# Patient Record
Sex: FEMALE | Race: WHITE
[De-identification: names, ages, dates, MRNs, and addresses within clinical notes are randomized per-mention and may not be internally consistent; named-entity substitution may affect disease eponyms.]

---

## 2019-01-21 ENCOUNTER — HOSPITAL ENCOUNTER (EMERGENCY)
Dept: HOSPITAL 76 - ED | Age: 24
Discharge: HOME | End: 2019-01-21
Payer: COMMERCIAL

## 2019-01-21 VITALS — SYSTOLIC BLOOD PRESSURE: 112 MMHG | DIASTOLIC BLOOD PRESSURE: 68 MMHG

## 2019-01-21 DIAGNOSIS — R11.2: Primary | ICD-10-CM

## 2019-01-21 DIAGNOSIS — R19.7: ICD-10-CM

## 2019-01-21 DIAGNOSIS — R10.30: ICD-10-CM

## 2019-01-21 LAB
ALBUMIN DIAFP-MCNC: 4.6 G/DL (ref 3.2–5.5)
ALBUMIN/GLOB SERPL: 1.2 {RATIO} (ref 1–2.2)
ALP SERPL-CCNC: 53 IU/L (ref 42–121)
ALT SERPL W P-5'-P-CCNC: 17 IU/L (ref 10–60)
AMPHET UR QL SCN: NEGATIVE
ANION GAP SERPL CALCULATED.4IONS-SCNC: 16 MMOL/L (ref 6–13)
AST SERPL W P-5'-P-CCNC: 28 IU/L (ref 10–42)
BASOPHILS NFR BLD AUTO: 0 10^3/UL (ref 0–0.1)
BASOPHILS NFR BLD AUTO: 0.5 %
BENZODIAZ UR QL SCN: NEGATIVE
BILIRUB BLD-MCNC: 1.2 MG/DL (ref 0.2–1)
BUN SERPL-MCNC: 14 MG/DL (ref 6–20)
CALCIUM UR-MCNC: 9.5 MG/DL (ref 8.5–10.3)
CHLORIDE SERPL-SCNC: 100 MMOL/L (ref 101–111)
CLARITY UR REFRACT.AUTO: CLEAR
CO2 SERPL-SCNC: 17 MMOL/L (ref 21–32)
COCAINE UR-SCNC: NEGATIVE UMOL/L
CREAT SERPLBLD-SCNC: 0.7 MG/DL (ref 0.4–1)
EOSINOPHIL # BLD AUTO: 0 10^3/UL (ref 0–0.7)
EOSINOPHIL NFR BLD AUTO: 0 %
ERYTHROCYTE [DISTWIDTH] IN BLOOD BY AUTOMATED COUNT: 13.6 % (ref 12–15)
GFRSERPLBLD MDRD-ARVRAT: 104 ML/MIN/{1.73_M2} (ref 89–?)
GLOBULIN SER-MCNC: 3.8 G/DL (ref 2.1–4.2)
GLUCOSE SERPL-MCNC: 137 MG/DL (ref 70–100)
GLUCOSE UR QL STRIP.AUTO: NEGATIVE MG/DL
HCG UR QL: NEGATIVE
HGB UR QL STRIP: 12.9 G/DL (ref 12–16)
KETONES UR QL STRIP.AUTO: >=80 MG/DL
LIPASE SERPL-CCNC: 23 U/L (ref 22–51)
LYMPHOCYTES # SPEC AUTO: 0.8 10^3/UL (ref 1.5–3.5)
LYMPHOCYTES NFR BLD AUTO: 10 %
MCH RBC QN AUTO: 29.6 PG (ref 27–31)
MCHC RBC AUTO-ENTMCNC: 33.8 G/DL (ref 32–36)
MCV RBC AUTO: 87.5 FL (ref 81–99)
METHADONE UR QL SCN: NEGATIVE
METHAMPHET UR QL SCN: NEGATIVE
MONOCYTES # BLD AUTO: 0.2 10^3/UL (ref 0–1)
MONOCYTES NFR BLD AUTO: 2.5 %
NEUTROPHILS # BLD AUTO: 6.7 10^3/UL (ref 1.5–6.6)
NEUTROPHILS # SNV AUTO: 7.7 X10^3/UL (ref 4.8–10.8)
NEUTROPHILS NFR BLD AUTO: 87 %
NITRITE UR QL STRIP.AUTO: NEGATIVE
OPIATES UR QL SCN: NEGATIVE
PDW BLD AUTO: 9 FL (ref 7.9–10.8)
PH UR STRIP.AUTO: 6 PH (ref 5–7.5)
PLATELET # BLD: 263 10^3/UL (ref 130–450)
PROT SPEC-MCNC: 8.4 G/DL (ref 6.7–8.2)
PROT UR STRIP.AUTO-MCNC: (no result) MG/DL
RBC # UR STRIP.AUTO: (no result) /UL
RBC MAR: 4.37 10^6/UL (ref 4.2–5.4)
SODIUM SERPLBLD-SCNC: 133 MMOL/L (ref 135–145)
SP GR UR STRIP.AUTO: >=1.03 (ref 1–1.03)
SP GR UR STRIP.AUTO: >=1.03 (ref 1–1.03)
UROBILINOGEN UR QL STRIP.AUTO: (no result) E.U./DL
UROBILINOGEN UR STRIP.AUTO-MCNC: NEGATIVE MG/DL
VOLATILE DRUGS POS SERPL SCN: (no result)

## 2019-01-21 PROCEDURE — 80306 DRUG TEST PRSMV INSTRMNT: CPT

## 2019-01-21 PROCEDURE — 81003 URINALYSIS AUTO W/O SCOPE: CPT

## 2019-01-21 PROCEDURE — 85025 COMPLETE CBC W/AUTO DIFF WBC: CPT

## 2019-01-21 PROCEDURE — 80053 COMPREHEN METABOLIC PANEL: CPT

## 2019-01-21 PROCEDURE — 81025 URINE PREGNANCY TEST: CPT

## 2019-01-21 PROCEDURE — 36415 COLL VENOUS BLD VENIPUNCTURE: CPT

## 2019-01-21 PROCEDURE — 87086 URINE CULTURE/COLONY COUNT: CPT

## 2019-01-21 PROCEDURE — 99283 EMERGENCY DEPT VISIT LOW MDM: CPT

## 2019-01-21 PROCEDURE — 83690 ASSAY OF LIPASE: CPT

## 2019-01-21 PROCEDURE — 81001 URINALYSIS AUTO W/SCOPE: CPT

## 2019-01-21 PROCEDURE — 74177 CT ABD & PELVIS W/CONTRAST: CPT

## 2019-01-21 NOTE — CT REPORT
Reason:  RLQ pain since this morning

Procedure Date:  01/21/2019   

Accession Number:  [id number] / [id number]                    

Procedure:  CT  - Abdomen/Pelvis W/ CPT Code:  

 

FULL RESULT:

 

 

EXAM:

CT ABDOMEN AND PELVIS

 

EXAM DATE: 1/21/2019 07:15 PM.

 

CLINICAL HISTORY: RLQ pain since this morning.

 

COMPARISONS: None.

 

TECHNIQUE: Routine helical CT imaging was performed through the abdomen 

and pelvis. IV contrast: 90 ML OPTIRAY 320. Enteric contrast: No. 

Reconstructions: Coronal and sagittal.

 

In accordance with CT protocol optimization, one or more of the following 

dose reduction techniques were utilized for this exam: automated exposure 

control, adjustment of mA and/or KV based on patient size, or use of 

iterative reconstructive technique.

 

FINDINGS:

Lung Bases: Unremarkable.

 

Liver: Normal. No masses.

 

Gallbladder/Bile Ducts: Unremarkable.

 

Spleen: Normal.

 

Pancreas: Normal.

 

Adrenal Glands: Normal.

 

Kidneys: Normal. No masses or hydronephrosis.

 

Peritoneal Cavity/Bowel: Normal. No free fluid, free air or adenopathy. 

No masses or acute inflammatory process. The appendix is not clearly 

identified. There is no CT evidence of acute appendicitis.

 

Pelvic Organs: Normal. The bladder and visualized pelvic organs are 

within normal limits.

 

Vasculature: No aneurysms or other significant abnormality.

 

Bones: No significant abnormality.

 

Other: None.

IMPRESSION: Nonvisualization of the appendix without CT evidence of acute 

appendicitis.

 

RADIA

## 2019-01-21 NOTE — ED PHYSICIAN DOCUMENTATION
PD HPI ABD PAIN





- Stated complaint


Stated Complaint: VOMITING/COLD SWEAT





- Chief complaint


Chief Complaint: Abd Pain





- History obtained from


History obtained from: Patient





- History of Present Illness


Timing - onset: Today


Timing - details: Abrupt onset, Still present


Quality: Cramping, Aching, Pain


Location: Periumbilical, RLQ, Suprapubic


Radiation: Lower back


Worsened by: Eating


Associated symptoms: Nausea, Diarrhea.  No: Fever, Vomiting, Constipation, 

Melena, Dysuria


Similar symptoms before: Has not had sx before


Recently seen: Not recently seen





Review of Systems


Constitutional: reports: Myalgias.  denies: Fever, Chills


Nose: denies: Rhinorrhea / runny nose, Congestion


Throat: denies: Sore throat


Respiratory: denies: Cough


GI: reports: Abdominal Pain, Nausea, Diarrhea.  denies: Abdominal Swelling, 

Vomiting, Constipation, Bloody / black stool


: denies: Dysuria, Frequency, Discharge, Irregular menses


Skin: denies: Rash, Lesions


Musculoskeletal: reports: Back pain.  denies: Neck pain, Extremity pain


Neurologic: reports: Generalized weakness.  denies: Focal weakness, Numbness





PD PAST MEDICAL HISTORY





- Past Medical History


Cardiovascular: None


Respiratory: None


Neuro: None


Endocrine/Autoimmune: None





- Present Medications


Home Medications: 


                                Ambulatory Orders











 Medication  Instructions  Recorded  Confirmed


 


Diphenoxylate/Atropine [Lomotil] 1 each PO QID PRN #12 tablet 01/21/19 


 


Naproxen 375 mg PO BID #20 tablet 01/21/19 


 


Norgestimate-Ethinyl Estradiol 1 tab PO DAILY 01/21/19 01/21/19





[Ortho Tri-Cyclen 28 Tablet]   


 


Ondansetron Odt [Zofran] 4 mg TL Q6H PRN #10 tablet 01/21/19 


 


Tramadol HCl 50 mg PO Q6H PRN #15 tablet 01/21/19 














- Allergies


Allergies/Adverse Reactions: 


                                    Allergies











Allergy/AdvReac Type Severity Reaction Status Date / Time


 


No Known Drug Allergies Allergy   Verified 01/21/19 16:32














PD ED PE NORMAL





- Vitals


Vital signs reviewed: Yes





- General


General: Alert and oriented X 3, Well developed/nourished, Other (seems very 

uncomfortable and knees drawn up. Holding emesis bag but not vomiting. )





- HEENT


HEENT: Ears normal, Pharynx benign





- Neck


Neck: Supple, no meningeal sign, No adenopathy





- Cardiac


Cardiac: RRR, No murmur





- Respiratory


Respiratory: Clear bilaterally





- Abdomen


Abdomen: Normal bowel sounds, Soft, Non distended, No organomegaly, Other 

(tender with local guarding in lower abd, right more than left, and also at 

suprapubic area. )





- Female 


Female : Deferred





- Back


Back: No CVA TTP





- Derm


Derm: Normal color, Warm and dry





- Extremities


Extremities: No deformity, No tenderness to palpate





- Neuro


Neuro: Alert and oriented X 3, No motor deficit, Normal speech





Results





- Vitals


Vitals: 


                                     Oxygen











O2 Source                      Room air

















- Labs


Labs: 


                                Laboratory Tests











  01/21/19 01/21/19 01/21/19





  17:11 17:11 18:15


 


WBC  7.7  


 


RBC  4.37  


 


Hgb  12.9  


 


Hct  38.3  


 


MCV  87.5  


 


MCH  29.6  


 


MCHC  33.8  


 


RDW  13.6  


 


Plt Count  263  


 


MPV  9.0  


 


Neut # (Auto)  6.7 H  


 


Lymph # (Auto)  0.8 L  


 


Mono # (Auto)  0.2  


 


Eos # (Auto)  0.0  


 


Baso # (Auto)  0.0  


 


Absolute Nucleated RBC  0.00  


 


Nucleated RBC %  0.0  


 


Sodium   133 L 


 


Potassium   3.6 


 


Chloride   100 L 


 


Carbon Dioxide   17 L 


 


Anion Gap   16.0 H 


 


BUN   14 


 


Creatinine   0.7 


 


Estimated GFR (MDRD)   104 


 


Glucose   137 H 


 


Calcium   9.5 


 


Total Bilirubin   1.2 H 


 


AST   28 


 


ALT   17 


 


Alkaline Phosphatase   53 


 


Total Protein   8.4 H 


 


Albumin   4.6 


 


Globulin   3.8 


 


Albumin/Globulin Ratio   1.2 


 


Lipase   23 


 


Urine Color    YELLOW


 


Urine Clarity    CLEAR


 


Urine pH    6.0


 


Ur Specific Gravity    >=1.030 H


 


Urine Protein    TRACE


 


Urine Glucose (UA)    NEGATIVE


 


Urine Ketones    >=80 H


 


Urine Occult Blood    TRACE-LYSE


 


Urine Nitrite    NEGATIVE


 


Urine Bilirubin    NEGATIVE


 


Urine Urobilinogen    0.2 (NORMAL)


 


Ur Leukocyte Esterase    NEGATIVE


 


Ur Microscopic Review    NOT INDICATED


 


Urine Culture Comments    NOT INDICATED


 


Urine HCG, Qual   


 


Urine Opiates Screen   


 


Ur Oxycodone Screen   


 


Urine Methadone Screen   


 


Ur Propoxyphene Screen   


 


Ur Barbiturates Screen   


 


Ur Tricyclics Screen   


 


Ur Phencyclidine Scrn   


 


Ur Amphetamine Screen   


 


U Methamphetamines Scrn   


 


U Benzodiazepines Scrn   


 


Urine Cocaine Screen   


 


U Cannabinoids Screen   














  01/21/19 01/21/19





  18:15 18:15


 


WBC  


 


RBC  


 


Hgb  


 


Hct  


 


MCV  


 


MCH  


 


MCHC  


 


RDW  


 


Plt Count  


 


MPV  


 


Neut # (Auto)  


 


Lymph # (Auto)  


 


Mono # (Auto)  


 


Eos # (Auto)  


 


Baso # (Auto)  


 


Absolute Nucleated RBC  


 


Nucleated RBC %  


 


Sodium  


 


Potassium  


 


Chloride  


 


Carbon Dioxide  


 


Anion Gap  


 


BUN  


 


Creatinine  


 


Estimated GFR (MDRD)  


 


Glucose  


 


Calcium  


 


Total Bilirubin  


 


AST  


 


ALT  


 


Alkaline Phosphatase  


 


Total Protein  


 


Albumin  


 


Globulin  


 


Albumin/Globulin Ratio  


 


Lipase  


 


Urine Color  


 


Urine Clarity  


 


Urine pH  


 


Ur Specific Gravity  >=1.030 H 


 


Urine Protein  


 


Urine Glucose (UA)  


 


Urine Ketones  


 


Urine Occult Blood  


 


Urine Nitrite  


 


Urine Bilirubin  


 


Urine Urobilinogen  


 


Ur Leukocyte Esterase  


 


Ur Microscopic Review  


 


Urine Culture Comments  


 


Urine HCG, Qual  NEGATIVE 


 


Urine Opiates Screen   NEGATIVE


 


Ur Oxycodone Screen   NEGATIVE


 


Urine Methadone Screen   NEGATIVE


 


Ur Propoxyphene Screen   NEGATIVE


 


Ur Barbiturates Screen   NEGATIVE


 


Ur Tricyclics Screen   NEGATIVE


 


Ur Phencyclidine Scrn   NEGATIVE


 


Ur Amphetamine Screen   NEGATIVE


 


U Methamphetamines Scrn   NEGATIVE


 


U Benzodiazepines Scrn   NEGATIVE


 


Urine Cocaine Screen   NEGATIVE


 


U Cannabinoids Screen   POSITIVE H














- Rads (name of study)


  ** abd/pelvic CT


Radiology: Prelim report reviewed (appendix not fully seen. No secondary signs 

of appendicitis. ), EMP read contemporaneously, See rad report





PD MEDICAL DECISION MAKING





- ED course


Complexity details: reviewed results (appendix not fully seen, but no secondary 

signs of appendicitis. Recheck of patient is much improved with meds and not 

tender RLQ per se at this time. Presume more colitis with the diarrhea and 

nausea. Gave Toradol then for inflammation. ), considered differential, d/w 

patient





Departure





- Departure


Disposition: 01 Home, Self Care


Clinical Impression: 


 Nausea vomiting and diarrhea





Abdominal pain


Qualifiers:


 Abdominal location: lower abdomen, unspecified Qualified Code(s): R10.30 - 

Lower abdominal pain, unspecified





Condition: Stable


Record reviewed to determine appropriate education?: Yes


Instructions:  ED Abdominal Pain Unkn Cause


Prescriptions: 


Diphenoxylate/Atropine [Lomotil] 1 each PO QID PRN #12 tablet


 PRN Reason: Diarrhea


Naproxen 375 mg PO BID #20 tablet


Ondansetron Odt [Zofran] 4 mg TL Q6H PRN #10 tablet


 PRN Reason: Nausea / Vomiting


Tramadol HCl 50 mg PO Q6H PRN #15 tablet


 PRN Reason: Pain


Comments: 


There is no obvious signs of appendicitis or other acute focal infection on your

 CT scan.  Presume the pain relates to the vomiting and diarrhea and most 

commonly will be a viral illness.  Use ondansetron if needed for nausea.  

Naproxen for inflammation and pain.  You can use antidiarrheal medicine if 

needed.  Small frequent fluids and see how you do over the next day or 2.  

Recheck if not improved over the next couple of days and return sooner if worse 

again.


Discharge Date/Time: 01/21/19 20:57

## 2019-10-13 ENCOUNTER — HOSPITAL ENCOUNTER (EMERGENCY)
Dept: HOSPITAL 76 - ED | Age: 24
Discharge: HOME | End: 2019-10-13
Payer: COMMERCIAL

## 2019-10-13 VITALS — DIASTOLIC BLOOD PRESSURE: 98 MMHG | SYSTOLIC BLOOD PRESSURE: 144 MMHG

## 2019-10-13 DIAGNOSIS — R10.9: ICD-10-CM

## 2019-10-13 DIAGNOSIS — E86.0: Primary | ICD-10-CM

## 2019-10-13 DIAGNOSIS — R11.2: ICD-10-CM

## 2019-10-13 LAB
ALBUMIN DIAFP-MCNC: 4.9 G/DL (ref 3.2–5.5)
ALBUMIN/GLOB SERPL: 1.2 {RATIO} (ref 1–2.2)
ALP SERPL-CCNC: 43 IU/L (ref 42–121)
ALT SERPL W P-5'-P-CCNC: 20 IU/L (ref 10–60)
ANION GAP SERPL CALCULATED.4IONS-SCNC: 16 MMOL/L (ref 6–13)
AST SERPL W P-5'-P-CCNC: 28 IU/L (ref 10–42)
BASOPHILS NFR BLD AUTO: 0 10^3/UL (ref 0–0.1)
BASOPHILS NFR BLD AUTO: 0.5 %
BILIRUB BLD-MCNC: 1.1 MG/DL (ref 0.2–1)
BILIRUB UR QL CFM: NEGATIVE
BUN SERPL-MCNC: 16 MG/DL (ref 6–20)
CALCIUM UR-MCNC: 9.7 MG/DL (ref 8.5–10.3)
CHLORIDE SERPL-SCNC: 101 MMOL/L (ref 101–111)
CLARITY UR REFRACT.AUTO: CLEAR
CO2 SERPL-SCNC: 22 MMOL/L (ref 21–32)
CREAT SERPLBLD-SCNC: 0.8 MG/DL (ref 0.4–1)
EOSINOPHIL # BLD AUTO: 0 10^3/UL (ref 0–0.7)
EOSINOPHIL NFR BLD AUTO: 0.1 %
ERYTHROCYTE [DISTWIDTH] IN BLOOD BY AUTOMATED COUNT: 13.3 % (ref 12–15)
GFRSERPLBLD MDRD-ARVRAT: 89 ML/MIN/{1.73_M2} (ref 89–?)
GLOBULIN SER-MCNC: 4 G/DL (ref 2.1–4.2)
GLUCOSE SERPL-MCNC: 113 MG/DL (ref 70–100)
GLUCOSE UR QL STRIP.AUTO: NEGATIVE MG/DL
HGB UR QL STRIP: 13.7 G/DL (ref 12–16)
KETONES UR QL STRIP.AUTO: >=80 MG/DL
LIPASE SERPL-CCNC: 21 U/L (ref 22–51)
LYMPHOCYTES # SPEC AUTO: 1.2 10^3/UL (ref 1.5–3.5)
LYMPHOCYTES NFR BLD AUTO: 16.4 %
MCH RBC QN AUTO: 28.1 PG (ref 27–31)
MCHC RBC AUTO-ENTMCNC: 32.2 G/DL (ref 32–36)
MCV RBC AUTO: 87.3 FL (ref 81–99)
MONOCYTES # BLD AUTO: 0.5 10^3/UL (ref 0–1)
MONOCYTES NFR BLD AUTO: 6.4 %
NEUTROPHILS # BLD AUTO: 5.7 10^3/UL (ref 1.5–6.6)
NEUTROPHILS # SNV AUTO: 7.5 X10^3/UL (ref 4.8–10.8)
NEUTROPHILS NFR BLD AUTO: 76.3 %
NITRITE UR QL STRIP.AUTO: NEGATIVE
PDW BLD AUTO: 10.3 FL (ref 7.9–10.8)
PH UR STRIP.AUTO: 6 PH (ref 5–7.5)
PLATELET # BLD: 346 10^3/UL (ref 130–450)
PROT SPEC-MCNC: 8.9 G/DL (ref 6.7–8.2)
PROT UR STRIP.AUTO-MCNC: NEGATIVE MG/DL
RBC # UR STRIP.AUTO: (no result) /UL
RBC MAR: 4.87 10^6/UL (ref 4.2–5.4)
SODIUM SERPLBLD-SCNC: 139 MMOL/L (ref 135–145)
SP GR UR STRIP.AUTO: 1.02 (ref 1–1.03)
UROBILINOGEN UR QL STRIP.AUTO: (no result) E.U./DL
UROBILINOGEN UR STRIP.AUTO-MCNC: NEGATIVE MG/DL

## 2019-10-13 PROCEDURE — 87086 URINE CULTURE/COLONY COUNT: CPT

## 2019-10-13 PROCEDURE — 85025 COMPLETE CBC W/AUTO DIFF WBC: CPT

## 2019-10-13 PROCEDURE — 81003 URINALYSIS AUTO W/O SCOPE: CPT

## 2019-10-13 PROCEDURE — 81001 URINALYSIS AUTO W/SCOPE: CPT

## 2019-10-13 PROCEDURE — 80053 COMPREHEN METABOLIC PANEL: CPT

## 2019-10-13 PROCEDURE — 83690 ASSAY OF LIPASE: CPT

## 2019-10-13 PROCEDURE — 96365 THER/PROPH/DIAG IV INF INIT: CPT

## 2019-10-13 PROCEDURE — 84702 CHORIONIC GONADOTROPIN TEST: CPT

## 2019-10-13 PROCEDURE — 99284 EMERGENCY DEPT VISIT MOD MDM: CPT

## 2019-10-13 PROCEDURE — 36415 COLL VENOUS BLD VENIPUNCTURE: CPT

## 2019-10-13 PROCEDURE — 96375 TX/PRO/DX INJ NEW DRUG ADDON: CPT

## 2019-10-13 NOTE — ED PHYSICIAN DOCUMENTATION
PD HPI ABD PAIN





- Stated complaint


Stated Complaint: ABD PX N/V





- Chief complaint


Chief Complaint: Abd Pain





- History obtained from


History obtained from: Patient, Family ()





- History of Present Illness


Timing - onset: Other (This is a 23-year-old woman with recurrent episodes of 

nausea vomiting and central abdominal pain.  The current episode started 2 

nights ago, she was vomiting all night, was better most of the day yesterday, 

and then worse again last night.  Now cannot keep anything down.  She has very 

mild diarrhea.  Last severe episode was in January, she was seen here.  Her 

white count was 7, CT of the abdomen and pelvis with IV contrast was normal.  

She did have evidence of dehydration on labs and her urinalysis was positive for

cannabinoids.  She admits to only occasional, not daily use of marijuana.)





Review of Systems


Ten Systems: 10 systems reviewed and negative


Constitutional: reports: Sweats.  denies: Fever, Chills


Nose: denies: Rhinorrhea / runny nose


Cardiac: denies: Chest pain / pressure, Palpitations


Respiratory: denies: Dyspnea, Cough


GI: reports: Abdominal Pain, Nausea, Vomiting.  denies: Diarrhea





PD PAST MEDICAL HISTORY





- Past Medical History


Cardiovascular: None


Respiratory: None


Neuro: None


Endocrine/Autoimmune: None


GI: Other


GYN: None


: None


HEENT: None


Psych: Depression, Anxiety, ADD/ADHD


Musculoskeletal: None


Derm: None





- Past Surgical History


Past Surgical History: No





- Present Medications


Home Medications: 


                                Ambulatory Orders











 Medication  Instructions  Recorded  Confirmed


 


Diphenoxylate/Atropine [Lomotil] 1 each PO QID PRN #12 tablet 01/21/19 


 


Naproxen 375 mg PO BID #20 tablet 01/21/19 


 


Norgestimate-Ethinyl Estradiol 1 tab PO DAILY 01/21/19 01/21/19





[Ortho Tri-Cyclen 28 Tablet]   


 


Ondansetron Odt [Zofran] 4 mg TL Q6H PRN #10 tablet 01/21/19 


 


Tramadol HCl 50 mg PO Q6H PRN #15 tablet 01/21/19 


 


Dicyclomine [Bentyl] 20 mg PO QID PRN #15 capsule 10/13/19 


 


Ondansetron Odt [Zofran] 4 mg TL Q6H PRN #10 tablet 10/13/19 














- Allergies


Allergies/Adverse Reactions: 


                                    Allergies











Allergy/AdvReac Type Severity Reaction Status Date / Time


 


No Known Drug Allergies Allergy   Verified 10/13/19 12:17














- Social History


Does the pt smoke?: No


Smoking Status: Never smoker


Does the pt drink ETOH?: Yes


Does the pt have substance abuse?: Yes





- Family History


Family history: reports: Non contributory





- Immunizations


Immunizations are current?: No





- POLST


Patient has POLST: No





PD ED PE NORMAL





- Vitals


Vital signs reviewed: Yes





- General


General: Alert and oriented X 3, Other (uncomfortable, retching)





- HEENT


HEENT: PERRL, EOMI, Pharynx benign





- Neck


Neck: Supple, no meningeal sign, No bony TTP





- Cardiac


Cardiac: RRR, No murmur





- Respiratory


Respiratory: No respiratory distress





- Abdomen


Abdomen: Normal bowel sounds, Soft, Other (Diminished bowel tones with mild 

diffuse tenderness, no surgical signs)





- Derm


Derm: Normal color, Warm and dry





- Extremities


Extremities: No edema, No calf tenderness / cord





- Neuro


Neuro: Alert and oriented X 3, Normal speech





Results





- Vitals


Vitals: 


                               Vital Signs - 24 hr











  10/13/19





  12:17


 


Temperature 36.9 C


 


Heart Rate 80


 


Respiratory 16





Rate 


 


Blood Pressure 135/91 H


 


O2 Saturation 97








                                     Oxygen











O2 Source                      Room air

















- Labs


Labs: 


                                Laboratory Tests











  10/13/19 10/13/19 10/13/19





  12:50 12:50 12:50


 


WBC  7.5  


 


RBC  4.87  


 


Hgb  13.7  


 


Hct  42.5  


 


MCV  87.3  


 


MCH  28.1  


 


MCHC  32.2  


 


RDW  13.3  


 


Plt Count  346  


 


MPV  10.3  


 


Neut # (Auto)  5.7  


 


Lymph # (Auto)  1.2 L  


 


Mono # (Auto)  0.5  


 


Eos # (Auto)  0.0  


 


Baso # (Auto)  0.0  


 


Absolute Nucleated RBC  0.00  


 


Nucleated RBC %  0.0  


 


Sodium   139 


 


Potassium   2.7 L 


 


Chloride   101 


 


Carbon Dioxide   22 


 


Anion Gap   16.0 H 


 


BUN   16 


 


Creatinine   0.8 


 


Estimated GFR (MDRD)   89 


 


Glucose   113 H 


 


Calcium   9.7 


 


Total Bilirubin   1.1 H 


 


AST   28 


 


ALT   20 


 


Alkaline Phosphatase   43 


 


Total Protein   8.9 H 


 


Albumin   4.9 


 


Globulin   4.0 


 


Albumin/Globulin Ratio   1.2 


 


Lipase   21 L 


 


HCG, Quant    < 0.60














PD MEDICAL DECISION MAKING





- ED course


ED course: 





23-year-old woman presents with 2 nights of vomiting.  Somewhat of a recurrent 

pattern.  She says she uses marijuana but not frequently.  Review of records 

show she was here for similar issues in January, work-up was negative then.  

Work-up today demonstrates normal CBC.  Electrolytes notable for potassium of 

2.7 which was repleted orally.  After divided doses of pain medications 

antiemetics she felt much better and passed an oral challenge.  She was 

nontender on recheck prior to discharge.





Departure





- Departure


Disposition: 01 Home, Self Care


Clinical Impression: 


 Dehydration





Vomiting


Qualifiers:


 Vomiting type: unspecified Vomiting Intractability: non-intractable Nausea 

presence: with nausea Qualified Code(s): R11.2 - Nausea with vomiting, unspe

cified





Condition: Good


Record reviewed to determine appropriate education?: Yes


Instructions:  Abdominal Pain, ED Nausea Vomiting


Prescriptions: 


Dicyclomine [Bentyl] 20 mg PO QID PRN #15 capsule


 PRN Reason: Abdominal Pain


Ondansetron Odt [Zofran] 4 mg TL Q6H PRN #10 tablet


 PRN Reason: Nausea / Vomiting


Comments: 


Return if worse or if not better in 24 hours or so.  Follow-up with your doctor 

regardless.


Forms:  Activity restrictions